# Patient Record
Sex: FEMALE | Race: BLACK OR AFRICAN AMERICAN | Employment: FULL TIME | URBAN - NONMETROPOLITAN AREA
[De-identification: names, ages, dates, MRNs, and addresses within clinical notes are randomized per-mention and may not be internally consistent; named-entity substitution may affect disease eponyms.]

---
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
## 2023-02-07 ENCOUNTER — HOSPITAL ENCOUNTER (OUTPATIENT)
Age: 18
Discharge: HOME | End: 2023-02-07
Attending: ADVANCED PRACTICE MIDWIFE | Admitting: ADVANCED PRACTICE MIDWIFE
Payer: COMMERCIAL

## 2023-02-07 VITALS
RESPIRATION RATE: 16 BRPM | TEMPERATURE: 98.9 F | HEIGHT: 63 IN | WEIGHT: 172 LBS | DIASTOLIC BLOOD PRESSURE: 60 MMHG | HEART RATE: 85 BPM | SYSTOLIC BLOOD PRESSURE: 108 MMHG | BODY MASS INDEX: 30.48 KG/M2

## 2023-02-07 PROBLEM — O99.891 BACK PAIN AFFECTING PREGNANCY IN THIRD TRIMESTER: Status: ACTIVE | Noted: 2023-02-07

## 2023-02-07 PROBLEM — M54.9 BACK PAIN AFFECTING PREGNANCY IN THIRD TRIMESTER: Status: ACTIVE | Noted: 2023-02-07

## 2023-02-07 PROBLEM — R82.90 ABNORMAL URINALYSIS: Status: ACTIVE | Noted: 2023-02-07

## 2023-02-07 LAB
BACTERIA: ABNORMAL
BILIRUBIN URINE: NEGATIVE
COLOR: YELLOW
EPITHELIAL CELLS UA: ABNORMAL /HPF (ref 0–25)
GLUCOSE UR STRIP.AUTO-MCNC: NEGATIVE MG/DL
KETONES UR STRIP.AUTO-MCNC: NEGATIVE MG/DL
LEUKOCYTE ESTERASE UR QL STRIP.AUTO: ABNORMAL
MUCUS: ABNORMAL
NITRITE UR QL STRIP.AUTO: NEGATIVE
PROT UR STRIP.AUTO-MCNC: 7.5 MG/DL (ref 5–9)
PROT UR STRIP.AUTO-MCNC: ABNORMAL MG/DL
RBC CLUMPS #/AREA URNS AUTO: ABNORMAL /HPF (ref 0–2)
SPECIFIC GRAVITY UA: 1.02 (ref 1.01–1.02)
TURBIDITY: ABNORMAL
URINE HGB: NEGATIVE
UROBILINOGEN, URINE: NORMAL
WBC UA: ABNORMAL /HPF (ref 0–5)

## 2023-02-07 PROCEDURE — 87086 URINE CULTURE/COLONY COUNT: CPT

## 2023-02-07 PROCEDURE — 81001 URINALYSIS AUTO W/SCOPE: CPT

## 2023-02-07 PROCEDURE — 99215 OFFICE O/P EST HI 40 MIN: CPT

## 2023-02-07 RX ORDER — CEPHALEXIN 500 MG/1
500 CAPSULE ORAL 3 TIMES DAILY
Qty: 21 CAPSULE | Refills: 0 | Status: SHIPPED | OUTPATIENT
Start: 2023-02-07 | End: 2023-02-14

## 2023-02-07 RX ORDER — ASPIRIN 81 MG/1
81 TABLET ORAL DAILY
COMMUNITY

## 2023-02-07 RX ORDER — FERROUS SULFATE 325(65) MG
325 TABLET ORAL
COMMUNITY

## 2023-02-07 NOTE — DISCHARGE INSTRUCTIONS
Call your physicians office or notify your physician by paging them if you experience:    Signs of Pre-eclampsia:  Dizziness or severe headache  Spots before eyes or blurred vision  Epigastric pain / Right side abdominal pain  Swelling of face, hands, legs or feet not decreased with rest on left side.     Signs of infection:  Chills  Fever  Start of, or change in, vaginal discharge    Signs of labor:  Uterine contractions are regular and 5 minutes apart if 1st baby or 10 minutes apart if not 1st baby  Bag or anderson leaking or gush of fluid from vagina  Any vaginal bleeding that is heavier than a menstrual period  Intermittent low backache  Abdominal or menstrual like cramping that is constant or heavier, comes and goes  Vaginal pressure    Potential for dehydration:  Vomiting or diarrhea for several hours    Potential abnormal fetal well being signs:  Decrease or absence of baby movement

## 2023-02-07 NOTE — PROGRESS NOTES
Howie Nation CNM in unit at this time. Made aware of patient arrival and c/o of abdominal and back pain, reactive strip with occasional contractions reviewed. UA and VS reviewed as well. Orders to check patient and culture urine results ordered.

## 2023-02-08 LAB
MICROORGANISM SPEC CULT: NORMAL
SPECIMEN DESCRIPTION: NORMAL

## 2023-02-08 NOTE — FLOWSHEET NOTE
Patient off unit in stable condition. AVS signed and reviewed. Patient stated understanding of prescriptions being sent to pharmacy.      Departure Mode: by self    Mobility at Departure: ambulatory    Discharged to: private residence    Time of Discharge: 1929

## 2023-04-02 ENCOUNTER — HOSPITAL ENCOUNTER (OUTPATIENT)
Age: 18
Discharge: HOME | End: 2023-04-02
Attending: OBSTETRICS & GYNECOLOGY | Admitting: OBSTETRICS & GYNECOLOGY
Payer: COMMERCIAL

## 2023-04-02 VITALS
SYSTOLIC BLOOD PRESSURE: 112 MMHG | HEART RATE: 81 BPM | TEMPERATURE: 98.2 F | RESPIRATION RATE: 16 BRPM | DIASTOLIC BLOOD PRESSURE: 72 MMHG

## 2023-04-02 PROBLEM — R10.9 ABDOMINAL CRAMPING: Status: ACTIVE | Noted: 2023-04-02

## 2023-04-02 LAB
BILIRUBIN URINE: NEGATIVE
COLOR: YELLOW
GLUCOSE UR STRIP.AUTO-MCNC: NEGATIVE MG/DL
KETONES UR STRIP.AUTO-MCNC: NEGATIVE MG/DL
LEUKOCYTE ESTERASE UR QL STRIP.AUTO: ABNORMAL
NITRITE UR QL STRIP.AUTO: NEGATIVE
PROT UR STRIP.AUTO-MCNC: 7 MG/DL (ref 5–9)
PROT UR STRIP.AUTO-MCNC: NEGATIVE MG/DL
SPECIFIC GRAVITY UA: 1.01 (ref 1.01–1.02)
TURBIDITY: CLEAR
URINE HGB: NEGATIVE
UROBILINOGEN, URINE: 0.2

## 2023-04-02 PROCEDURE — 99212 OFFICE O/P EST SF 10 MIN: CPT

## 2023-04-02 NOTE — FLOWSHEET NOTE
Pt arrived to unit via wheelchair. Pt c/o lower abdominal cramping feeling like contractions and back pain. Pt provider in [place], she had an appointment on Thurs when provider performed an external version. Pt denies bleeding or leaking of fluid.

## 2023-04-02 NOTE — FLOWSHEET NOTE
Pt discharged from unit at 0212. Epic downtime in process at this time -- back charting orders and discharge.

## (undated) DEVICE — PACK,LITHOTOMY: Brand: MEDLINE

## (undated) DEVICE — GOWN,AURORA,NON-REINFORCED,2XL: Brand: MEDLINE

## (undated) DEVICE — PEN: MARKING STD 100/CS: Brand: MEDICAL ACTION INDUSTRIES

## (undated) DEVICE — SOLUTION IV IRRIG POUR BRL 0.9% SODIUM CHL 2F7124

## (undated) DEVICE — PAD N ADH W3XL4IN POLY COT SFT PERF FLM EASILY CUT ABSRB

## (undated) DEVICE — CATHETER URETH 16FR L16IN RED RUB INTMIT ROB MOD BARDX

## (undated) DEVICE — PREP PAD BNS: Brand: CONVERTORS

## (undated) DEVICE — GAUZE,SPONGE,4"X4",16PLY,XRAY,STRL,LF: Brand: MEDLINE

## (undated) DEVICE — TOWEL,OR,DSP,ST,BLUE,STD,4/PK,20PK/CS: Brand: MEDLINE

## (undated) DEVICE — Z DISCONTINUED BY MEDLINE USE 2711682 TRAY SKIN PREP DRY W/ PREM GLV

## (undated) DEVICE — SPONGE,TONSIL,DBL STRNG,XR,LG,1-1/4,ST: Brand: MEDLINE INDUSTRIES, INC.

## (undated) DEVICE — SOLUTION SURG PREP ANTIMICROBIAL 4 OZ SKIN WND EXIDINE

## (undated) DEVICE — GLOVE ORANGE PI 8   MSG9080

## (undated) DEVICE — COVER LT HNDL BLU PLAS

## (undated) DEVICE — UNDERPANTS INCONT XL 45-70IN KNIT SEAMLESS DSGN COLOR-CODED

## (undated) DEVICE — PAD,ABDOMINAL,8"X10",ST,LF: Brand: MEDLINE